# Patient Record
Sex: FEMALE | Race: WHITE
[De-identification: names, ages, dates, MRNs, and addresses within clinical notes are randomized per-mention and may not be internally consistent; named-entity substitution may affect disease eponyms.]

---

## 2017-03-14 ENCOUNTER — PRIOR ORIGINAL RECORDS (OUTPATIENT)
Dept: OTHER | Age: 37
End: 2017-03-14

## 2017-03-14 ENCOUNTER — HOSPITAL ENCOUNTER (EMERGENCY)
Facility: HOSPITAL | Age: 37
Discharge: HOME | End: 2017-03-14
Attending: EMERGENCY MEDICINE
Payer: COMMERCIAL

## 2017-03-14 ENCOUNTER — APPOINTMENT (OUTPATIENT)
Dept: GENERAL RADIOLOGY | Facility: HOSPITAL | Age: 37
End: 2017-03-14
Attending: EMERGENCY MEDICINE
Payer: COMMERCIAL

## 2017-03-14 VITALS
DIASTOLIC BLOOD PRESSURE: 79 MMHG | HEART RATE: 87 BPM | RESPIRATION RATE: 24 BRPM | SYSTOLIC BLOOD PRESSURE: 128 MMHG | WEIGHT: 129 LBS | BODY MASS INDEX: 20.25 KG/M2 | HEIGHT: 67 IN | OXYGEN SATURATION: 100 % | TEMPERATURE: 100 F

## 2017-03-14 DIAGNOSIS — R06.4 HYPERVENTILATION: Primary | ICD-10-CM

## 2017-03-14 DIAGNOSIS — R94.31 SHORTENED PR INTERVAL: ICD-10-CM

## 2017-03-14 DIAGNOSIS — R00.2 PALPITATIONS: ICD-10-CM

## 2017-03-14 LAB
ALBUMIN SERPL-MCNC: 3.8 G/DL (ref 3.5–4.8)
ALP LIVER SERPL-CCNC: 47 U/L (ref 37–98)
ALT SERPL-CCNC: 20 U/L (ref 14–54)
AST SERPL-CCNC: 20 U/L (ref 15–41)
BASOPHILS # BLD AUTO: 0.06 X10(3) UL (ref 0–0.1)
BASOPHILS NFR BLD AUTO: 0.8 %
BILIRUB SERPL-MCNC: 0.5 MG/DL (ref 0.1–2)
BUN BLD-MCNC: 13 MG/DL (ref 8–20)
CALCIUM BLD-MCNC: 9.4 MG/DL (ref 8.3–10.3)
CHLORIDE: 105 MMOL/L (ref 101–111)
CO2: 23 MMOL/L (ref 22–32)
CREAT BLD-MCNC: 0.59 MG/DL (ref 0.55–1.02)
EOSINOPHIL # BLD AUTO: 0.08 X10(3) UL (ref 0–0.3)
EOSINOPHIL NFR BLD AUTO: 1 %
ERYTHROCYTE [DISTWIDTH] IN BLOOD BY AUTOMATED COUNT: 13.2 % (ref 11.5–16)
GLUCOSE BLD-MCNC: 83 MG/DL (ref 70–99)
HCT VFR BLD AUTO: 38 % (ref 34–50)
HGB BLD-MCNC: 12.7 G/DL (ref 12–16)
IMMATURE GRANULOCYTE COUNT: 0.02 X10(3) UL (ref 0–1)
IMMATURE GRANULOCYTE RATIO %: 0.3 %
LYMPHOCYTES # BLD AUTO: 2.56 X10(3) UL (ref 0.9–4)
LYMPHOCYTES NFR BLD AUTO: 33.2 %
M PROTEIN MFR SERPL ELPH: 7.3 G/DL (ref 6.1–8.3)
MCH RBC QN AUTO: 28.9 PG (ref 27–33.2)
MCHC RBC AUTO-ENTMCNC: 33.4 G/DL (ref 31–37)
MCV RBC AUTO: 86.6 FL (ref 81–100)
MONOCYTES # BLD AUTO: 0.28 X10(3) UL (ref 0.1–0.6)
MONOCYTES NFR BLD AUTO: 3.6 %
NEUTROPHIL ABS PRELIM: 4.72 X10 (3) UL (ref 1.3–6.7)
NEUTROPHILS # BLD AUTO: 4.72 X10(3) UL (ref 1.3–6.7)
NEUTROPHILS NFR BLD AUTO: 61.1 %
PLATELET # BLD AUTO: 269 10(3)UL (ref 150–450)
POTASSIUM SERPL-SCNC: 4 MMOL/L (ref 3.6–5.1)
RBC # BLD AUTO: 4.39 X10(6)UL (ref 3.8–5.1)
RED CELL DISTRIBUTION WIDTH-SD: 41.5 FL (ref 35.1–46.3)
SODIUM SERPL-SCNC: 139 MMOL/L (ref 136–144)
TROPONIN: <0.046 NG/ML (ref ?–0.05)
TSI SER-ACNC: 1.47 MIU/ML (ref 0.35–5.5)
WBC # BLD AUTO: 7.7 X10(3) UL (ref 4–13)

## 2017-03-14 PROCEDURE — 93010 ELECTROCARDIOGRAM REPORT: CPT

## 2017-03-14 PROCEDURE — 84443 ASSAY THYROID STIM HORMONE: CPT | Performed by: EMERGENCY MEDICINE

## 2017-03-14 PROCEDURE — 99285 EMERGENCY DEPT VISIT HI MDM: CPT

## 2017-03-14 PROCEDURE — 93005 ELECTROCARDIOGRAM TRACING: CPT

## 2017-03-14 PROCEDURE — 80053 COMPREHEN METABOLIC PANEL: CPT | Performed by: EMERGENCY MEDICINE

## 2017-03-14 PROCEDURE — 84484 ASSAY OF TROPONIN QUANT: CPT | Performed by: EMERGENCY MEDICINE

## 2017-03-14 PROCEDURE — 96361 HYDRATE IV INFUSION ADD-ON: CPT

## 2017-03-14 PROCEDURE — 99284 EMERGENCY DEPT VISIT MOD MDM: CPT

## 2017-03-14 PROCEDURE — 96374 THER/PROPH/DIAG INJ IV PUSH: CPT

## 2017-03-14 PROCEDURE — 85025 COMPLETE CBC W/AUTO DIFF WBC: CPT | Performed by: EMERGENCY MEDICINE

## 2017-03-14 PROCEDURE — 71010 XR CHEST AP PORTABLE  (CPT=71010): CPT

## 2017-03-14 RX ORDER — BUPROPION HYDROCHLORIDE 150 MG/1
150 TABLET, EXTENDED RELEASE ORAL DAILY
COMMUNITY
End: 2017-06-21

## 2017-03-14 RX ORDER — GARLIC EXTRACT 500 MG
1 CAPSULE ORAL DAILY
COMMUNITY

## 2017-03-14 RX ORDER — CLONAZEPAM 0.5 MG/1
0.5 TABLET ORAL AS NEEDED
COMMUNITY

## 2017-03-14 RX ORDER — LORAZEPAM 2 MG/ML
1 INJECTION INTRAMUSCULAR ONCE
Status: COMPLETED | OUTPATIENT
Start: 2017-03-14 | End: 2017-03-14

## 2017-03-14 NOTE — ED INITIAL ASSESSMENT (HPI)
Patient states about 1400 today vision became blurry and patient feels like she \"greyed out. \" Pt states visual changes caused patient to become anxious and she took half a klonopin.  Patient took second half of klonopin when she got home, patient remains

## 2017-03-15 LAB
ATRIAL RATE: 91 BPM
P AXIS: 76 DEGREES
P-R INTERVAL: 106 MS
Q-T INTERVAL: 380 MS
QRS DURATION: 92 MS
QTC CALCULATION (BEZET): 467 MS
R AXIS: 69 DEGREES
T AXIS: 58 DEGREES
VENTRICULAR RATE: 91 BPM

## 2017-03-15 NOTE — ED PROVIDER NOTES
Patient Seen in: [facility] Emergency Department    History   Patient presents with:   Anxiety/Panic attack (neurologic)    Stated Complaint:     HPI    68-year-old female presents to the emergency department stating that she had initially what she t complaint:   Other systems are as noted in HPI. Constitutional and vital signs reviewed. All other systems reviewed and negative except as noted above. PSFH elements reviewed from today and agreed except as otherwise stated in HPI.     Physical Exa Narrative: The following orders were created for panel order CBC WITH DIFFERENTIAL WITH PLATELET.   Procedure                               Abnormality         Status                     ---------                               -----------         ------

## 2017-03-20 ENCOUNTER — MYAURORA ACCOUNT LINK (OUTPATIENT)
Dept: OTHER | Age: 37
End: 2017-03-20

## 2017-03-20 ENCOUNTER — PRIOR ORIGINAL RECORDS (OUTPATIENT)
Dept: OTHER | Age: 37
End: 2017-03-20

## 2017-03-23 LAB
ALBUMIN: 3.8 G/DL
ALKALINE PHOSPHATATE(ALK PHOS): 47 IU/L
BILIRUBIN TOTAL: 0.5 MG/DL
BUN: 13 MG/DL
CALCIUM: 9.4 MG/DL
CHLORIDE: 105 MEQ/L
CREATININE, SERUM: 0.59 MG/DL
GLUCOSE: 83 MG/DL
HEMATOCRIT: 38 %
HEMOGLOBIN: 12.7 G/DL
PLATELETS: 269 K/UL
POTASSIUM, SERUM: 4 MEQ/L
PROTEIN, TOTAL: 7.3 G/DL
RED BLOOD COUNT: 4.39 X 10-6/U
SGOT (AST): 20 IU/L
SGPT (ALT): 20 IU/L
SODIUM: 139 MEQ/L
THYROID STIMULATING HORMONE: 1.47 MLU/L
WHITE BLOOD COUNT: 7.7 X 10-3/U

## 2017-03-29 PROBLEM — R00.2 PALPITATIONS: Status: ACTIVE | Noted: 2017-03-29

## 2017-09-18 ENCOUNTER — HOSPITAL ENCOUNTER (EMERGENCY)
Facility: HOSPITAL | Age: 37
Discharge: HOME | End: 2017-09-18
Attending: EMERGENCY MEDICINE
Payer: COMMERCIAL

## 2017-09-18 VITALS
DIASTOLIC BLOOD PRESSURE: 56 MMHG | TEMPERATURE: 98 F | RESPIRATION RATE: 16 BRPM | HEART RATE: 63 BPM | SYSTOLIC BLOOD PRESSURE: 114 MMHG | OXYGEN SATURATION: 100 %

## 2017-09-18 DIAGNOSIS — R07.89 CHEST WALL PAIN: Primary | ICD-10-CM

## 2017-09-18 LAB
D-DIMER: <0.27 UG/ML FEU (ref 0–0.49)
TROPONIN: <0.046 NG/ML (ref ?–0.05)

## 2017-09-18 PROCEDURE — 99284 EMERGENCY DEPT VISIT MOD MDM: CPT

## 2017-09-18 PROCEDURE — 84484 ASSAY OF TROPONIN QUANT: CPT | Performed by: EMERGENCY MEDICINE

## 2017-09-18 PROCEDURE — 93005 ELECTROCARDIOGRAM TRACING: CPT

## 2017-09-18 PROCEDURE — 85378 FIBRIN DEGRADE SEMIQUANT: CPT | Performed by: EMERGENCY MEDICINE

## 2017-09-18 PROCEDURE — 96374 THER/PROPH/DIAG INJ IV PUSH: CPT

## 2017-09-18 PROCEDURE — 93010 ELECTROCARDIOGRAM REPORT: CPT

## 2017-09-18 RX ORDER — NAPROXEN 500 MG/1
500 TABLET ORAL 2 TIMES DAILY PRN
Qty: 20 TABLET | Refills: 0 | Status: SHIPPED | OUTPATIENT
Start: 2017-09-18 | End: 2017-10-25

## 2017-09-18 RX ORDER — KETOROLAC TROMETHAMINE 30 MG/ML
30 INJECTION, SOLUTION INTRAMUSCULAR; INTRAVENOUS ONCE
Status: COMPLETED | OUTPATIENT
Start: 2017-09-18 | End: 2017-09-18

## 2017-09-18 NOTE — ED PROVIDER NOTES
Patient Seen in: [facility] Emergency Department    History   Patient presents with:  Pain (neurologic)    Stated Complaint: upper abd/rib/back pain    HPI    16-year-old female comes the hospital chief complaint of having difficulty with some pain clubbing, cyanosis or edema noted. Full range of motion noted without tenderness  Neuro: No focal deficits noted    ED Course     Labs Reviewed   TROPONIN I - Normal   D-DIMER - Normal    Narrative:      FEU = Fibrinogen Equivalent Units.     In non-pregna encounter diagnosis)    Disposition:  Discharge    Follow-up:  Darlene Maddox MD  [address] [place] [address]  [phone]    Schedule an appointment as soon as possible for a visit in 2 days        Medications Prescribed:  Current Discharge Me

## 2017-09-19 LAB
ATRIAL RATE: 75 BPM
P AXIS: 76 DEGREES
P-R INTERVAL: 114 MS
Q-T INTERVAL: 418 MS
QRS DURATION: 94 MS
QTC CALCULATION (BEZET): 466 MS
R AXIS: 68 DEGREES
T AXIS: 48 DEGREES
VENTRICULAR RATE: 75 BPM

## 2017-10-26 ENCOUNTER — LAB ENCOUNTER (OUTPATIENT)
Dept: LAB | Age: 37
End: 2017-10-26
Attending: NURSE PRACTITIONER
Payer: COMMERCIAL

## 2017-10-26 DIAGNOSIS — R19.7 DIARRHEA, UNSPECIFIED TYPE: ICD-10-CM

## 2017-10-26 PROCEDURE — 80053 COMPREHEN METABOLIC PANEL: CPT

## 2017-10-26 PROCEDURE — 82607 VITAMIN B-12: CPT

## 2017-10-26 PROCEDURE — 82746 ASSAY OF FOLIC ACID SERUM: CPT

## 2017-10-26 PROCEDURE — 85652 RBC SED RATE AUTOMATED: CPT

## 2017-10-26 PROCEDURE — 85025 COMPLETE CBC W/AUTO DIFF WBC: CPT

## 2017-10-26 PROCEDURE — 36415 COLL VENOUS BLD VENIPUNCTURE: CPT

## 2017-10-26 PROCEDURE — 86140 C-REACTIVE PROTEIN: CPT

## 2019-03-01 VITALS
WEIGHT: 132 LBS | SYSTOLIC BLOOD PRESSURE: 106 MMHG | DIASTOLIC BLOOD PRESSURE: 62 MMHG | BODY MASS INDEX: 21.21 KG/M2 | HEIGHT: 66 IN

## 2019-10-18 ENCOUNTER — EMPLOYEE HEALTH (OUTPATIENT)
Dept: OCCUPATIONAL MEDICINE | Age: 39
End: 2019-10-18
Attending: PHYSICIAN ASSISTANT

## 2019-10-24 ENCOUNTER — EMPLOYEE HEALTH (OUTPATIENT)
Dept: OCCUPATIONAL MEDICINE | Age: 39
End: 2019-10-24
Attending: FAMILY MEDICINE

## 2019-10-24 DIAGNOSIS — Z00.00 ANNUAL PHYSICAL EXAM: ICD-10-CM

## 2019-10-24 DIAGNOSIS — Z01.84 IMMUNITY STATUS TESTING: Primary | ICD-10-CM

## 2019-10-24 PROCEDURE — 86787 VARICELLA-ZOSTER ANTIBODY: CPT

## 2019-10-24 PROCEDURE — 86480 TB TEST CELL IMMUN MEASURE: CPT

## 2019-10-24 PROCEDURE — 86735 MUMPS ANTIBODY: CPT

## 2019-10-24 PROCEDURE — 86765 RUBEOLA ANTIBODY: CPT

## 2019-10-24 PROCEDURE — 86762 RUBELLA ANTIBODY: CPT

## 2021-10-11 ENCOUNTER — HOSPITAL ENCOUNTER (OUTPATIENT)
Age: 41
Discharge: ANOTHER HOSPITAL | End: 2021-10-11
Payer: COMMERCIAL

## 2021-10-11 ENCOUNTER — APPOINTMENT (OUTPATIENT)
Dept: CT IMAGING | Age: 41
End: 2021-10-11
Attending: PHYSICIAN ASSISTANT
Payer: COMMERCIAL

## 2021-10-11 VITALS
SYSTOLIC BLOOD PRESSURE: 115 MMHG | BODY MASS INDEX: 27 KG/M2 | DIASTOLIC BLOOD PRESSURE: 56 MMHG | RESPIRATION RATE: 16 BRPM | HEART RATE: 91 BPM | TEMPERATURE: 97 F | OXYGEN SATURATION: 99 % | WEIGHT: 175 LBS

## 2021-10-11 VITALS
SYSTOLIC BLOOD PRESSURE: 108 MMHG | BODY MASS INDEX: 27.47 KG/M2 | OXYGEN SATURATION: 97 % | RESPIRATION RATE: 18 BRPM | HEIGHT: 67 IN | HEART RATE: 85 BPM | TEMPERATURE: 98 F | DIASTOLIC BLOOD PRESSURE: 73 MMHG | WEIGHT: 175 LBS

## 2021-10-11 DIAGNOSIS — E86.0 DEHYDRATION: Primary | ICD-10-CM

## 2021-10-11 DIAGNOSIS — R11.0 NAUSEA: ICD-10-CM

## 2021-10-11 DIAGNOSIS — R10.32 ABDOMINAL PAIN, LEFT LOWER QUADRANT: ICD-10-CM

## 2021-10-11 DIAGNOSIS — E16.2 HYPOGLYCEMIA: ICD-10-CM

## 2021-10-11 PROCEDURE — 87086 URINE CULTURE/COLONY COUNT: CPT | Performed by: PHYSICIAN ASSISTANT

## 2021-10-11 PROCEDURE — 85025 COMPLETE CBC W/AUTO DIFF WBC: CPT | Performed by: PHYSICIAN ASSISTANT

## 2021-10-11 PROCEDURE — 99284 EMERGENCY DEPT VISIT MOD MDM: CPT

## 2021-10-11 PROCEDURE — 96375 TX/PRO/DX INJ NEW DRUG ADDON: CPT

## 2021-10-11 PROCEDURE — 82962 GLUCOSE BLOOD TEST: CPT

## 2021-10-11 PROCEDURE — 96361 HYDRATE IV INFUSION ADD-ON: CPT

## 2021-10-11 PROCEDURE — 99214 OFFICE O/P EST MOD 30 MIN: CPT

## 2021-10-11 PROCEDURE — 96374 THER/PROPH/DIAG INJ IV PUSH: CPT

## 2021-10-11 PROCEDURE — 74177 CT ABD & PELVIS W/CONTRAST: CPT | Performed by: PHYSICIAN ASSISTANT

## 2021-10-11 PROCEDURE — 80047 BASIC METABLC PNL IONIZED CA: CPT

## 2021-10-11 PROCEDURE — 81002 URINALYSIS NONAUTO W/O SCOPE: CPT | Performed by: PHYSICIAN ASSISTANT

## 2021-10-11 RX ORDER — DEXTROSE MONOHYDRATE 25 G/50ML
50 INJECTION, SOLUTION INTRAVENOUS ONCE
Status: COMPLETED | OUTPATIENT
Start: 2021-10-11 | End: 2021-10-11

## 2021-10-11 RX ORDER — ONDANSETRON 2 MG/ML
4 INJECTION INTRAMUSCULAR; INTRAVENOUS ONCE
Status: COMPLETED | OUTPATIENT
Start: 2021-10-11 | End: 2021-10-11

## 2021-10-11 RX ORDER — BUPROPION HYDROCHLORIDE 100 MG/1
300 TABLET ORAL DAILY
COMMUNITY

## 2021-10-11 RX ORDER — ONDANSETRON 4 MG/1
4 TABLET, ORALLY DISINTEGRATING ORAL ONCE
Status: COMPLETED | OUTPATIENT
Start: 2021-10-11 | End: 2021-10-11

## 2021-10-11 RX ORDER — ONDANSETRON 4 MG/1
4 TABLET, ORALLY DISINTEGRATING ORAL EVERY 4 HOURS PRN
Qty: 30 TABLET | Refills: 0 | Status: SHIPPED | OUTPATIENT
Start: 2021-10-11

## 2021-10-11 RX ORDER — SODIUM CHLORIDE 9 MG/ML
1000 INJECTION, SOLUTION INTRAVENOUS ONCE
Status: COMPLETED | OUTPATIENT
Start: 2021-10-11 | End: 2021-10-11

## 2021-10-11 NOTE — ED PROVIDER NOTES
Patient Seen in: [facility]      History   Patient presents with:  Abdomen/Flank Pain    Stated Complaint: stomach pains/nause 3 days    Subjective:   HPI    71-year-old female here with complaint of severe nausea and left lower quadrant BMI 27.41 kg/m²         Physical Exam  Vitals and nursing note reviewed. Constitutional:       Appearance: She is well-developed. HENT:      Head: Normocephalic.       Right Ear: External ear normal.      Left Ear: External ear normal.      Nose: Nose n CULTURE, ROUTINE   RAPID SARS-COV-2 BY PCR   CT ABDOMEN+PELVIS(CONTRAST ONLY)(CPT=74177)    Result Date: 10/11/2021  PROCEDURE:  CT ABDOMEN+PELVIS (CONTRAST ONLY) (CPT=74177)  COMPARISON:  None.   INDICATIONS:  stomach pains/nause 3 days  TECHNIQUE:  CT sca evident from this study. There is no acute abnormality demonstrated.    Dictated by (CST): Gabe Kinney MD on 10/11/2021 at 7:58 PM     Finalized by (CST): Gabe Kinney MD on 10/11/2021 at 8:03 PM     I personally reviewed the xray images and radiology r

## 2021-10-12 ENCOUNTER — HOSPITAL ENCOUNTER (EMERGENCY)
Facility: HOSPITAL | Age: 41
Discharge: HOME | End: 2021-10-12
Attending: EMERGENCY MEDICINE
Payer: COMMERCIAL

## 2021-10-12 DIAGNOSIS — K29.00 ACUTE GASTRITIS WITHOUT HEMORRHAGE, UNSPECIFIED GASTRITIS TYPE: Primary | ICD-10-CM

## 2021-10-12 DIAGNOSIS — R11.2 NAUSEA AND VOMITING IN ADULT: ICD-10-CM

## 2021-10-12 PROCEDURE — 85025 COMPLETE CBC W/AUTO DIFF WBC: CPT | Performed by: EMERGENCY MEDICINE

## 2021-10-12 PROCEDURE — 80053 COMPREHEN METABOLIC PANEL: CPT | Performed by: EMERGENCY MEDICINE

## 2021-10-12 PROCEDURE — 83690 ASSAY OF LIPASE: CPT | Performed by: EMERGENCY MEDICINE

## 2021-10-12 RX ORDER — METOCLOPRAMIDE HYDROCHLORIDE 5 MG/ML
10 INJECTION INTRAMUSCULAR; INTRAVENOUS ONCE
Status: COMPLETED | OUTPATIENT
Start: 2021-10-12 | End: 2021-10-12

## 2021-10-12 RX ORDER — DIPHENHYDRAMINE HYDROCHLORIDE 50 MG/ML
25 INJECTION INTRAMUSCULAR; INTRAVENOUS ONCE
Status: COMPLETED | OUTPATIENT
Start: 2021-10-12 | End: 2021-10-12

## 2021-10-12 RX ORDER — DEXTROSE MONOHYDRATE 25 G/50ML
50 INJECTION, SOLUTION INTRAVENOUS ONCE
Status: COMPLETED | OUTPATIENT
Start: 2021-10-12 | End: 2021-10-12

## 2021-10-12 NOTE — ED INITIAL ASSESSMENT (HPI)
Patient reports left sided abdominal/pelvis pain x 3 days, associated with constant nausea. No vomiting or diarrhea, no fever.

## 2022-08-10 ENCOUNTER — APPOINTMENT (OUTPATIENT)
Dept: ULTRASOUND IMAGING | Age: 42
End: 2022-08-10
Attending: PHYSICIAN ASSISTANT
Payer: COMMERCIAL

## 2022-08-10 ENCOUNTER — HOSPITAL ENCOUNTER (EMERGENCY)
Age: 42
Discharge: HOME | End: 2022-08-10
Attending: EMERGENCY MEDICINE
Payer: COMMERCIAL

## 2022-08-10 VITALS
DIASTOLIC BLOOD PRESSURE: 65 MMHG | RESPIRATION RATE: 18 BRPM | TEMPERATURE: 98 F | WEIGHT: 180 LBS | HEIGHT: 67 IN | SYSTOLIC BLOOD PRESSURE: 110 MMHG | HEART RATE: 65 BPM | OXYGEN SATURATION: 99 % | BODY MASS INDEX: 28.25 KG/M2

## 2022-08-10 DIAGNOSIS — K29.00 ACUTE GASTRITIS WITHOUT HEMORRHAGE, UNSPECIFIED GASTRITIS TYPE: Primary | ICD-10-CM

## 2022-08-10 DIAGNOSIS — R19.7 DIARRHEA, UNSPECIFIED TYPE: ICD-10-CM

## 2022-08-10 DIAGNOSIS — K80.00 GALLSTONES AND INFLAMMATION OF GALLBLADDER WITHOUT OBSTRUCTION: ICD-10-CM

## 2022-08-10 DIAGNOSIS — K80.20 CALCULUS OF GALLBLADDER WITHOUT CHOLECYSTITIS WITHOUT OBSTRUCTION: ICD-10-CM

## 2022-08-10 LAB
ALBUMIN SERPL-MCNC: 3.6 G/DL (ref 3.4–5)
ALBUMIN/GLOB SERPL: 0.9 {RATIO} (ref 1–2)
ALP LIVER SERPL-CCNC: 71 U/L
ALT SERPL-CCNC: 19 U/L
ANION GAP SERPL CALC-SCNC: 6 MMOL/L (ref 0–18)
AST SERPL-CCNC: 13 U/L (ref 15–37)
ATRIAL RATE: 68 BPM
B-HCG UR QL: NEGATIVE
BASOPHILS # BLD AUTO: 0.04 X10(3) UL (ref 0–0.2)
BASOPHILS NFR BLD AUTO: 0.7 %
BILIRUB SERPL-MCNC: 0.3 MG/DL (ref 0.1–2)
BILIRUB UR QL STRIP.AUTO: NEGATIVE
BUN BLD-MCNC: 11 MG/DL (ref 7–18)
CALCIUM BLD-MCNC: 9 MG/DL (ref 8.5–10.1)
CHLORIDE SERPL-SCNC: 107 MMOL/L (ref 98–112)
CLARITY UR REFRACT.AUTO: CLEAR
CO2 SERPL-SCNC: 24 MMOL/L (ref 21–32)
COLOR UR AUTO: YELLOW
CREAT BLD-MCNC: 0.61 MG/DL
EOSINOPHIL # BLD AUTO: 0.1 X10(3) UL (ref 0–0.7)
EOSINOPHIL NFR BLD AUTO: 1.8 %
ERYTHROCYTE [DISTWIDTH] IN BLOOD BY AUTOMATED COUNT: 14.6 %
GFR SERPLBLD BASED ON 1.73 SQ M-ARVRAT: 114 ML/MIN/1.73M2 (ref 60–?)
GLOBULIN PLAS-MCNC: 3.8 G/DL (ref 2.8–4.4)
GLUCOSE BLD-MCNC: 85 MG/DL (ref 70–99)
GLUCOSE UR STRIP.AUTO-MCNC: NEGATIVE MG/DL
HCT VFR BLD AUTO: 39.2 %
HGB BLD-MCNC: 12.3 G/DL
IMM GRANULOCYTES # BLD AUTO: 0.01 X10(3) UL (ref 0–1)
IMM GRANULOCYTES NFR BLD: 0.2 %
KETONES UR STRIP.AUTO-MCNC: NEGATIVE MG/DL
LEUKOCYTE ESTERASE UR QL STRIP.AUTO: NEGATIVE
LIPASE SERPL-CCNC: 270 U/L (ref 73–393)
LYMPHOCYTES # BLD AUTO: 1.43 X10(3) UL (ref 1–4)
LYMPHOCYTES NFR BLD AUTO: 25.7 %
MCH RBC QN AUTO: 26.8 PG (ref 26–34)
MCHC RBC AUTO-ENTMCNC: 31.4 G/DL (ref 31–37)
MCV RBC AUTO: 85.4 FL
MONOCYTES # BLD AUTO: 0.37 X10(3) UL (ref 0.1–1)
MONOCYTES NFR BLD AUTO: 6.7 %
NEUTROPHILS # BLD AUTO: 3.61 X10 (3) UL (ref 1.5–7.7)
NEUTROPHILS # BLD AUTO: 3.61 X10(3) UL (ref 1.5–7.7)
NEUTROPHILS NFR BLD AUTO: 64.9 %
NITRITE UR QL STRIP.AUTO: NEGATIVE
OSMOLALITY SERPL CALC.SUM OF ELEC: 283 MOSM/KG (ref 275–295)
P AXIS: 64 DEGREES
P-R INTERVAL: 124 MS
PH UR STRIP.AUTO: 5 [PH] (ref 5–8)
PLATELET # BLD AUTO: 342 10(3)UL (ref 150–450)
POTASSIUM SERPL-SCNC: 4.1 MMOL/L (ref 3.5–5.1)
PROT SERPL-MCNC: 7.4 G/DL (ref 6.4–8.2)
PROT UR STRIP.AUTO-MCNC: NEGATIVE MG/DL
Q-T INTERVAL: 406 MS
QRS DURATION: 92 MS
QTC CALCULATION (BEZET): 431 MS
R AXIS: 49 DEGREES
RBC # BLD AUTO: 4.59 X10(6)UL
RBC UR QL AUTO: NEGATIVE
SARS-COV-2 RNA RESP QL NAA+PROBE: NOT DETECTED
SODIUM SERPL-SCNC: 137 MMOL/L (ref 136–145)
SP GR UR STRIP.AUTO: 1.01 (ref 1–1.03)
T AXIS: 43 DEGREES
UROBILINOGEN UR STRIP.AUTO-MCNC: 0.2 MG/DL
VENTRICULAR RATE: 68 BPM
WBC # BLD AUTO: 5.6 X10(3) UL (ref 4–11)

## 2022-08-10 PROCEDURE — 81003 URINALYSIS AUTO W/O SCOPE: CPT | Performed by: EMERGENCY MEDICINE

## 2022-08-10 PROCEDURE — 85025 COMPLETE CBC W/AUTO DIFF WBC: CPT | Performed by: PHYSICIAN ASSISTANT

## 2022-08-10 PROCEDURE — 76700 US EXAM ABDOM COMPLETE: CPT | Performed by: PHYSICIAN ASSISTANT

## 2022-08-10 PROCEDURE — 96375 TX/PRO/DX INJ NEW DRUG ADDON: CPT

## 2022-08-10 PROCEDURE — 93005 ELECTROCARDIOGRAM TRACING: CPT

## 2022-08-10 PROCEDURE — 81025 URINE PREGNANCY TEST: CPT

## 2022-08-10 PROCEDURE — 83690 ASSAY OF LIPASE: CPT | Performed by: PHYSICIAN ASSISTANT

## 2022-08-10 PROCEDURE — 80053 COMPREHEN METABOLIC PANEL: CPT | Performed by: PHYSICIAN ASSISTANT

## 2022-08-10 PROCEDURE — 96361 HYDRATE IV INFUSION ADD-ON: CPT

## 2022-08-10 PROCEDURE — S0028 INJECTION, FAMOTIDINE, 20 MG: HCPCS | Performed by: PHYSICIAN ASSISTANT

## 2022-08-10 PROCEDURE — 96374 THER/PROPH/DIAG INJ IV PUSH: CPT

## 2022-08-10 PROCEDURE — 93010 ELECTROCARDIOGRAM REPORT: CPT

## 2022-08-10 PROCEDURE — 99284 EMERGENCY DEPT VISIT MOD MDM: CPT

## 2022-08-10 RX ORDER — ONDANSETRON 4 MG/1
4 TABLET, ORALLY DISINTEGRATING ORAL EVERY 4 HOURS PRN
Qty: 10 TABLET | Refills: 0 | Status: SHIPPED | OUTPATIENT
Start: 2022-08-10 | End: 2022-08-17

## 2022-08-10 RX ORDER — ONDANSETRON 2 MG/ML
4 INJECTION INTRAMUSCULAR; INTRAVENOUS ONCE
Status: COMPLETED | OUTPATIENT
Start: 2022-08-10 | End: 2022-08-10

## 2022-08-10 RX ORDER — LANSOPRAZOLE 30 MG/1
60 CAPSULE, DELAYED RELEASE ORAL
COMMUNITY

## 2022-08-10 RX ORDER — FAMOTIDINE 10 MG/ML
20 INJECTION, SOLUTION INTRAVENOUS ONCE
Status: COMPLETED | OUTPATIENT
Start: 2022-08-10 | End: 2022-08-10

## 2022-08-10 NOTE — ED INITIAL ASSESSMENT (HPI)
Pt reports epigastric/stomach burning pain and diarrhea apprx 10 episodes per day for the past 4 days. Pt c/o nausea but no vomiting. Pt states she was seen about 2 wks ago for abd pain and was told she has probable ucler but is unable to see GI until oct.

## 2022-08-10 NOTE — ED QUICK NOTES
Pt instructed on how to collect stool sample and directions to bathroom. Pt told to press call light when urge occurs for assistance.

## 2024-01-08 ENCOUNTER — HOSPITAL ENCOUNTER (EMERGENCY)
Age: 44
Discharge: HOME | End: 2024-01-08
Attending: EMERGENCY MEDICINE
Payer: COMMERCIAL

## 2024-01-08 ENCOUNTER — OFFICE VISIT (OUTPATIENT)
Dept: FAMILY MEDICINE CLINIC | Facility: CLINIC | Age: 44
End: 2024-01-08
Payer: COMMERCIAL

## 2024-01-08 ENCOUNTER — APPOINTMENT (OUTPATIENT)
Dept: CT IMAGING | Age: 44
End: 2024-01-08
Attending: EMERGENCY MEDICINE
Payer: COMMERCIAL

## 2024-01-08 VITALS
DIASTOLIC BLOOD PRESSURE: 70 MMHG | OXYGEN SATURATION: 99 % | SYSTOLIC BLOOD PRESSURE: 100 MMHG | HEART RATE: 57 BPM | RESPIRATION RATE: 18 BRPM | WEIGHT: 135 LBS | TEMPERATURE: 98 F | HEIGHT: 67 IN | BODY MASS INDEX: 21.19 KG/M2

## 2024-01-08 VITALS
BODY MASS INDEX: 20.88 KG/M2 | WEIGHT: 133 LBS | OXYGEN SATURATION: 98 % | HEART RATE: 65 BPM | TEMPERATURE: 99 F | SYSTOLIC BLOOD PRESSURE: 123 MMHG | DIASTOLIC BLOOD PRESSURE: 66 MMHG | HEIGHT: 67 IN | RESPIRATION RATE: 16 BRPM

## 2024-01-08 DIAGNOSIS — R63.4 WEIGHT LOSS, UNINTENTIONAL: ICD-10-CM

## 2024-01-08 DIAGNOSIS — F43.29 STRESS AND ADJUSTMENT REACTION: ICD-10-CM

## 2024-01-08 DIAGNOSIS — R42 DIZZINESS: Primary | ICD-10-CM

## 2024-01-08 DIAGNOSIS — G44.209 TENSION HEADACHE: ICD-10-CM

## 2024-01-08 LAB
ALBUMIN SERPL-MCNC: 3.5 G/DL (ref 3.4–5)
ALBUMIN/GLOB SERPL: 1.1 {RATIO} (ref 1–2)
ALP LIVER SERPL-CCNC: 48 U/L
ALT SERPL-CCNC: 18 U/L
ANION GAP SERPL CALC-SCNC: 6 MMOL/L (ref 0–18)
AST SERPL-CCNC: 14 U/L (ref 15–37)
BASOPHILS # BLD AUTO: 0.05 X10(3) UL (ref 0–0.2)
BASOPHILS NFR BLD AUTO: 0.7 %
BILIRUB SERPL-MCNC: 0.6 MG/DL (ref 0.1–2)
BUN BLD-MCNC: 9 MG/DL (ref 9–23)
CALCIUM BLD-MCNC: 8.7 MG/DL (ref 8.5–10.1)
CHLORIDE SERPL-SCNC: 109 MMOL/L (ref 98–112)
CO2 SERPL-SCNC: 25 MMOL/L (ref 21–32)
CREAT BLD-MCNC: 0.48 MG/DL
EGFRCR SERPLBLD CKD-EPI 2021: 120 ML/MIN/1.73M2 (ref 60–?)
EOSINOPHIL # BLD AUTO: 0.06 X10(3) UL (ref 0–0.7)
EOSINOPHIL NFR BLD AUTO: 0.9 %
ERYTHROCYTE [DISTWIDTH] IN BLOOD BY AUTOMATED COUNT: 13.8 %
GLOBULIN PLAS-MCNC: 3.1 G/DL (ref 2.8–4.4)
GLUCOSE BLD-MCNC: 84 MG/DL (ref 70–99)
HCT VFR BLD AUTO: 36 %
HGB BLD-MCNC: 11.8 G/DL
IMM GRANULOCYTES # BLD AUTO: 0.01 X10(3) UL (ref 0–1)
IMM GRANULOCYTES NFR BLD: 0.1 %
LYMPHOCYTES # BLD AUTO: 1.98 X10(3) UL (ref 1–4)
LYMPHOCYTES NFR BLD AUTO: 28.3 %
MAGNESIUM SERPL-MCNC: 2.1 MG/DL (ref 1.6–2.6)
MCH RBC QN AUTO: 28.1 PG (ref 26–34)
MCHC RBC AUTO-ENTMCNC: 32.8 G/DL (ref 31–37)
MCV RBC AUTO: 85.7 FL
MONOCYTES # BLD AUTO: 0.23 X10(3) UL (ref 0.1–1)
MONOCYTES NFR BLD AUTO: 3.3 %
NEUTROPHILS # BLD AUTO: 4.67 X10 (3) UL (ref 1.5–7.7)
NEUTROPHILS # BLD AUTO: 4.67 X10(3) UL (ref 1.5–7.7)
NEUTROPHILS NFR BLD AUTO: 66.7 %
OSMOLALITY SERPL CALC.SUM OF ELEC: 288 MOSM/KG (ref 275–295)
PLATELET # BLD AUTO: 311 10(3)UL (ref 150–450)
POTASSIUM SERPL-SCNC: 3.9 MMOL/L (ref 3.5–5.1)
PROT SERPL-MCNC: 6.6 G/DL (ref 6.4–8.2)
RBC # BLD AUTO: 4.2 X10(6)UL
SODIUM SERPL-SCNC: 140 MMOL/L (ref 136–145)
TSI SER-ACNC: 0.59 MIU/ML (ref 0.36–3.74)
WBC # BLD AUTO: 7 X10(3) UL (ref 4–11)

## 2024-01-08 PROCEDURE — 84443 ASSAY THYROID STIM HORMONE: CPT | Performed by: EMERGENCY MEDICINE

## 2024-01-08 PROCEDURE — 96361 HYDRATE IV INFUSION ADD-ON: CPT

## 2024-01-08 PROCEDURE — 99284 EMERGENCY DEPT VISIT MOD MDM: CPT

## 2024-01-08 PROCEDURE — 96374 THER/PROPH/DIAG INJ IV PUSH: CPT

## 2024-01-08 PROCEDURE — 70450 CT HEAD/BRAIN W/O DYE: CPT | Performed by: EMERGENCY MEDICINE

## 2024-01-08 PROCEDURE — 99285 EMERGENCY DEPT VISIT HI MDM: CPT

## 2024-01-08 PROCEDURE — 80053 COMPREHEN METABOLIC PANEL: CPT | Performed by: EMERGENCY MEDICINE

## 2024-01-08 PROCEDURE — 83735 ASSAY OF MAGNESIUM: CPT | Performed by: EMERGENCY MEDICINE

## 2024-01-08 PROCEDURE — 85025 COMPLETE CBC W/AUTO DIFF WBC: CPT | Performed by: EMERGENCY MEDICINE

## 2024-01-08 RX ORDER — MECLIZINE HYDROCHLORIDE 25 MG/1
25 TABLET ORAL ONCE
Status: COMPLETED | OUTPATIENT
Start: 2024-01-08 | End: 2024-01-08

## 2024-01-08 RX ORDER — MECLIZINE HYDROCHLORIDE 25 MG/1
25 TABLET ORAL 3 TIMES DAILY PRN
Qty: 30 TABLET | Refills: 0 | Status: SHIPPED | OUTPATIENT
Start: 2024-01-08 | End: 2024-02-07

## 2024-01-08 RX ORDER — FLUTICASONE PROPIONATE 50 MCG
2 SPRAY, SUSPENSION (ML) NASAL DAILY
Qty: 16 G | Refills: 0 | Status: SHIPPED | OUTPATIENT
Start: 2024-01-08 | End: 2024-02-07

## 2024-01-08 RX ORDER — KETOROLAC TROMETHAMINE 30 MG/ML
30 INJECTION, SOLUTION INTRAMUSCULAR; INTRAVENOUS ONCE
Status: COMPLETED | OUTPATIENT
Start: 2024-01-08 | End: 2024-01-08

## 2024-01-08 NOTE — ED INITIAL ASSESSMENT (HPI)
Was sent from urgent care.  Reports dizziness for past 5 days.  Reports \"band\" of pain from one ear across the sinuses to other ear.  Reports parietal headache.  Took ibuprofen about 0430 this am..repors nausea without vomiting.

## 2024-01-08 NOTE — PROGRESS NOTES
Patient presents to clinic for worsening dizziness/facial pressure/headache, bilateral ear pain/pressure. Has not fallen due to dizziness,  has not woken from sleep. Notes dizziness x 5 days, has tried Advil with good temporary relief of headache. Denies GI symptoms, chest congestion, change in vision. Has only tried Advil and one half tablet of Clonazapam last night. Feels as if under high stress.  Past Medical History:   Diagnosis Date    Abdominal pain     Anxiety     Back pain     Bad breath     Bloating     Decorative tattoo     Depression     Diarrhea, unspecified     Flatulence/gas pain/belching     Food intolerance     Heart palpitations     History of depression     Irregular bowel habits     Nausea     Night sweats     Stress     Wears glasses      Past Surgical History:   Procedure Laterality Date    ANESTH, SECTION  2005    REMOVAL OF FALLOPIAN TUBE Bilateral      Current Outpatient Medications on File Prior to Visit   Medication Sig Dispense Refill    lansoprazole 30 MG Oral Capsule Delayed Release Take 60 mg by mouth every morning before breakfast. (Patient not taking: Reported on 2024)      ondansetron 4 MG Oral Tablet Dispersible Take 1 tablet (4 mg total) by mouth every 4 (four) hours as needed for Nausea. (Patient not taking: Reported on 2024) 30 tablet 0    buPROPion 100 MG Oral Tab Take 300 mg by mouth daily. (Patient not taking: Reported on 2024)      PEG 3350-KCl-NaBcb-NaCl-NaSulf (PEG 3350/ELECTROLYTES) 240 g Oral Recon Soln Take as directed by physician. (Patient not taking: Reported on 2024) 4000 mL 0    Sertraline HCl 50 MG Oral Tab Take 100 mg by mouth daily.   (Patient not taking: Reported on 2024)  0    ClonazePAM 0.5 MG Oral Tab Take 0.5 mg by mouth as needed for Anxiety. (Patient not taking: Reported on 2024)      Acidophilus/Pectin Oral Cap Take 1 capsule by mouth daily. (Patient not taking: Reported on 2024)       No current  facility-administered medications on file prior to visit.     /70   Pulse 57   Temp 97.9 °F (36.6 °C)   Resp 18   Ht 5' 7\" (1.702 m)   Wt 135 lb (61.2 kg)   SpO2 99%   BMI 21.14 kg/m²     GENERAL: well developed, well nourished,appears unwell with sniffling often, lying under blanket on table  HEENT: PERLLA, conjunctiva clear, atraumatic, normocephalic,Ears with clear TMs noted bilaterally, mild rhinorrhea Pharynx with no erythema, edema or exudate. no sinus tenderness with palpation  NECK: supple,no adenopathy,no bruits  LUNGS: clear to auscultation, no wheeze, rhonchi or rales noted  CARDIO: RRR without murmur  MUSCULOSKELETAL: no laxity of joints noted, normal gait  EXTREMITIES: no cyanosis, clubbing or edema  NEURO: CN 2-12 intact,. EOM intact. PERRLA. Lateral/vertical gaze movement not causing dizziness.    Accompanied by: spouse  After triage, higher acuity of care was recommended to Radha   today.   Rationale: Need for further evaluation and management outside the scope of practice for the walk in clinic  Site recommendation: to  ED for evaluation and workup. May be congestion/stress related but needs further workup for treatment. Spouse can drive patient to ER for evaluation  Patient/parent verbalized understanding of rationale for further evaluation and was stable upon discharge.

## 2024-01-08 NOTE — ED PROVIDER NOTES
Patient Seen in: [facility] [place]      History     Chief Complaint   Patient presents with   • Dizziness     Stated Complaint: dizziness/headache- ear pressure    Subjective:   HPI  Patient presents with dizziness, headache across the back of her neck and the back of her skull, pressure in her ears, feels dizzy \"inside mny head\" when standing    Under a significant amount of stress and has no appetite, is not eating, sleeping only about 6 to 8 hours a night  55 pound phil loss since September    All of this has been ongoing but the posterior headache going into her ears and the dizziness is fairly new.  That has only been going on for about 2 weeks.  Objective:   Past Medical History:   Diagnosis Date   • Abdominal pain    • Anxiety    • Back pain    • Bad breath    • Bloating    • Decorative tattoo    • Depression    • Diarrhea, unspecified    • Flatulence/gas pain/belching    • Food intolerance    • Heart palpitations    • History of depression    • Irregular bowel habits    • Nausea    • Night sweats    • Stress    • Wears glasses               Past Surgical History:   Procedure Laterality Date   • ANESTH, SECTION     • REMOVAL OF FALLOPIAN TUBE Bilateral                 Social History     Socioeconomic History   • Marital status:    Tobacco Use   • Smoking status: Former   • Smokeless tobacco: Never   Vaping Use   • Vaping Use: Every day   Substance and Sexual Activity   • Alcohol use: No   • Drug use: No              Review of Systems    Positive for stated complaint: dizziness/headache- ear pressure  Other systems are as noted in HPI.  Constitutional and vital signs reviewed.      All other systems reviewed and negative except as noted above.    Physical Exam     ED Triage Vitals [24 1208]   /78   Pulse 73   Resp 16   Temp 99 °F (37.2 °C)   Temp src Temporal   SpO2 98 %   O2 Device None (Room air)       Current:/58   Pulse 60   Temp 99 °F  (37.2 °C) (Temporal)   Resp 18   Ht 170.2 cm (5' 7\")   Wt 60.3 kg   LMP 12/25/2023   SpO2 99%   BMI 20.83 kg/m²         Physical Exam    ***To have bilateral ear effusions, thin,    ED Course     Labs Reviewed   COMP METABOLIC PANEL (14)   CBC WITH DIFFERENTIAL WITH PLATELET          ED Course as of 01/08/24 1346  ------------------------------------------------------------  Time: 01/08 1342  Value: SARS-CoV-2/Flu A and B/RSV by PCR (GeneXpert)  Comment: (Reviewed)     ***         MDM      ***                                   Medical Decision Making      Disposition and Plan     Clinical Impression:  No diagnosis found.     Disposition:  There is no disposition on file for this visit.  There is no disposition time on file for this visit.    Follow-up:  No follow-up provider specified.        Medications Prescribed:  Current Discharge Medication List                                          Abnormal; Notable for the following components:       Result Value    Creatinine 0.48 (*)     AST 14 (*)     All other components within normal limits   CBC W/ DIFFERENTIAL - Abnormal; Notable for the following components:    HGB 11.8 (*)     All other components within normal limits   TSH W REFLEX TO FREE T4 - Normal   MAGNESIUM - Normal   CBC WITH DIFFERENTIAL WITH PLATELET    Narrative:     The following orders were created for panel order CBC With Differential With Platelet.  Procedure                               Abnormality         Status                     ---------                               -----------         ------                     CBC W/ DIFFERENTIAL[[id number]]          Abnormal            Final result                 Please view results for these tests on the individual orders.          ED Course as of 01/17/24 1653  ------------------------------------------------------------  Time: 01/08 1342  Value: SARS-CoV-2/Flu A and B/RSV by PCR (GeneXpert)  Comment: (Reviewed)              MDM      Presents to ED with concerns of dixxiness, ear fullness, weight loss, low energy - concern for infections - no cough or chills  symptoms increasing over past several months     Diffdx- sinus congestion, benign positional vertigo, intracranial abnormality, electrolyte disturbance, long Covid, infulenza, endocrine dysfunction, certainly patient is concerned about some depressionand anxiety symptoms as well but they seem unrelated to dizziness- could also be atypical migraines    Workup ensued- reassuring labs and CT brain to rule out intracranial abnormality    Symptoms treatment with meclizine, toradol IVF's    Able to reassure patient that other than tm effusions that may be causing vertiginous symptoms - long conversation reviewing the results                                      Medical Decision Making      Disposition and Plan     Clinical Impression:  1. Dizziness    2. Weight loss, unintentional    3.  Tension headache    4. Stress and adjustment reaction         Disposition:  Discharge  1/8/2024  4:21 pm    Follow-up:  Denisa Ambrocio  [address] [facility] [id number] [phone]    Schedule an appointment as soon as possible for a visit  asap          Medications Prescribed:  Discharge Medication List as of 1/8/2024  4:26 PM        START taking these medications    Details   sertraline 50 MG Oral Tab Take 1 tablet (50 mg total) by mouth daily., Normal, Disp-30 tablet, R-0      meclizine 25 MG Oral Tab Take 1 tablet (25 mg total) by mouth 3 (three) times daily as needed for Dizziness., Normal, Disp-30 tablet, R-0      fluticasone propionate 50 MCG/ACT Nasal Suspension 2 sprays by Nasal route daily., Normal, Disp-16 g, R-0

## 2024-01-08 NOTE — DISCHARGE INSTRUCTIONS
Please continue to drink plenty of fluids, call your doctor to make an appointment.  If you feel comfortable in doing so I think it is reasonable to start sertraline because until the stressful events of your life have settled, you may need some assistance with the depression and anxiety.  Remember to \"employ your no's\" and try to increase sleep.  If possible I would advocate massage for head and neck which may help with some of the symptoms.  Please remember to keep eating as much as possible

## (undated) NOTE — ED AVS SNAPSHOT
[facility] Emergency Department    [facility]  [address] [address]    Phone:  [phone]    Fax:  [phone]           Maru Leon   MRN: [id number]    Department:  [facility] Emergency Department   Date of Visit:  3/14/ IF THERE IS ANY CHANGE OR WORSENING OF YOUR CONDITION, CALL YOUR PRIMARY CARE PHYSICIAN AT ONCE OR RETURN IMMEDIATELY TO THE EMERGENCY DEPARTMENT.     If you have been prescribed any medication(s), please fill your prescription right away and begin taking t

## (undated) NOTE — LETTER
Date & Time: 1/8/2024, 4:29 PM  Patient: Radha Black  Encounter Provider(s):    Anjali López MD       To Whom It May Concern:    Radha Black was seen and treated in our department on 1/8/2024. She should not return to work until 1/13/24 .    If you have any questions or concerns, please do not hesitate to call.        _____________________________  Physician/APC Signature

## (undated) NOTE — ED AVS SNAPSHOT
Rosa Isela Bahena   MRN: [id number]    Department:  [facility] Emergency Department   Date of Visit:  9/18/2017           Disclosure     Insurance plans vary and the physician(s) referred by the ER may not be covered by your plan.  Please contact your i If you have been prescribed any medication(s), please fill your prescription right away and begin taking the medication(s) as directed    If the emergency physician has read X-rays, these will be re-interpreted by a radiologist.  If there is a significant

## (undated) NOTE — ED AVS SNAPSHOT
[facility] Emergency Department    [place]  [address] [address]    Phone:  [phone]    Fax:  [phone]           Howard Epps   MRN: [id number]    Department:  [facility] Emergency Department   Date of Visit:  3/14/ If you have any problems with your follow-up, please call our  at [phone]    Si usted tiene algun problema con oates sequimiento, por favor llame a nuestro adminstrador de casos al [phone]    Expect to receive an electronic request Deborah WalBristols [address] ([address]) [address]   [facility]. ([address] [facility]) [address]  (Do TECHNIQUE:  AP chest radiograph was obtained. COMPARISON:  None. INDICATIONS:  sob     PATIENT STATED HISTORY:  Patient has experienced numbness in her left side and her tongue since this evening. Shes has been shaking since she got to the ER.